# Patient Record
(demographics unavailable — no encounter records)

---

## 2024-10-22 NOTE — HISTORY OF PRESENT ILLNESS
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 He was admitted for bloody stools and poor weight gain. Has been doing well on Elecare 6-8oz every 4 hours and solid foods. Mother states she prepares meals for him with steamed whole milk. Patient receives cereal with formula and a banana for breakfast, for lunch patient receives 1 oz of sweet potatoes with mixed steamed whole milk and for dinner  patient has meat and veggies. Patient burps after feeds, no spit ups or vomiting. Patient has been more constipated in the recent month, mother hard stools and last stool was Friday (10/18). Today mother reports noticing patient was straining with efforts to poop, but no success. Denies blood or mucus in the stool. Denies diarrhea or gassiness. Gaining weight, tracking at the 51%ile.   Ordered stool occult and reviewed today: positive

## 2024-10-22 NOTE — ASSESSMENT
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 Patient has been on to Elecare formula and solid foods with mixed steamed whole milk, tolerating well. Denies spitting up, vomiting of gassiness. Mother reports recent constipation with hard stools. Denies blood or mucus. Patient is gaining weight and tracking at the 51%ile. Stool occult guaiac test preformed, resulted positive. Given patient is developing appropriately and tolerating feeds, recommended to maintain diet as is. also, given patient's travel plans in November recommended to drop off stool sample 2 days prior to trip (11/4) to assess for blood in stool and give further instructions if neccessary.,   PLAN - Continue diet as is with cooked milk  - Dropp off stool sample 2 days prior to travel (11/4)  - Follow up in 1 month or earlier if needed

## 2024-10-22 NOTE — REASON FOR VISIT
[Consultation Follow Up] : a consultation follow up  [Mother] : mother [FreeTextEntry2] : milk protein allergy

## 2024-10-22 NOTE — HISTORY OF PRESENT ILLNESS
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 He was admitted for bloody stools and poor weight gain. Has been doing well on Elecare 6-8oz every 4 hours and solid foods. Mother states she prepares meals for him with steamed whole milk. Patient receives cereal with formula and a banana for breakfast, for lunch patient receives 1 oz of sweet potatoes with mixed steamed whole milk and for dinner  patient has meat and veggies. Patient burps after feeds, no spit ups or vomiting. Patient has been more constipated in the recent month, mother hard stools and last stool was Friday (10/18). Today mother reports noticing patient was straining with efforts to poop, but no success. Denies blood or mucus in the stool. Denies diarrhea or gassiness. Gaining weight, tracking at the 51%ile.   Ordered stool occult and reviewed today: positive

## 2025-02-17 NOTE — CONSULT LETTER
[Dear  ___] : Dear  [unfilled], [Courtesy Letter:] : I had the pleasure of seeing your patient, [unfilled], in my office today. [Please see my note below.] : Please see my note below. [Consult Closing:] : Thank you very much for allowing me to participate in the care of this patient.  If you have any questions, please do not hesitate to contact me. [Sincerely,] : Sincerely, [FreeTextEntry2] : Dr Resendiz [FreeTextEntry3] : Mitzy García MD Pediatric Hematology/Oncology [place] [id number]